# Patient Record
Sex: MALE | Race: WHITE | NOT HISPANIC OR LATINO | URBAN - METROPOLITAN AREA
[De-identification: names, ages, dates, MRNs, and addresses within clinical notes are randomized per-mention and may not be internally consistent; named-entity substitution may affect disease eponyms.]

---

## 2023-05-12 ENCOUNTER — OFFICE VISIT (OUTPATIENT)
Age: 63
End: 2023-05-12

## 2023-05-12 VITALS
BODY MASS INDEX: 18.04 KG/M2 | DIASTOLIC BLOOD PRESSURE: 88 MMHG | WEIGHT: 126 LBS | HEIGHT: 70 IN | SYSTOLIC BLOOD PRESSURE: 132 MMHG

## 2023-05-12 DIAGNOSIS — Z90.49 HISTORY OF COLECTOMY: ICD-10-CM

## 2023-05-12 DIAGNOSIS — Z86.010 PERSONAL HISTORY OF COLONIC POLYPS: ICD-10-CM

## 2023-05-12 DIAGNOSIS — K59.00 CONSTIPATION, UNSPECIFIED CONSTIPATION TYPE: Primary | ICD-10-CM

## 2023-05-12 DIAGNOSIS — Z80.0 FAMILY HX OF COLON CANCER: ICD-10-CM

## 2023-05-12 DIAGNOSIS — Z12.11 COLON CANCER SCREENING: ICD-10-CM

## 2023-05-12 RX ORDER — ATORVASTATIN CALCIUM 40 MG/1
40 TABLET, FILM COATED ORAL DAILY
COMMUNITY

## 2023-05-12 RX ORDER — ASPIRIN 81 MG/1
81 TABLET ORAL DAILY
COMMUNITY

## 2023-05-12 RX ORDER — LINACLOTIDE 72 UG/1
72 CAPSULE, GELATIN COATED ORAL DAILY
Qty: 30 CAPSULE | Refills: 3 | Status: SHIPPED | OUTPATIENT
Start: 2023-05-12 | End: 2023-06-11

## 2023-05-12 NOTE — PROGRESS NOTES
[id number] - Outpatient Consultation  Wendi Mata 70 y o  male MRN: [id number]  Encounter: [id number]    ASSESSMENT AND PLAN:      1  Constipation, unspecified constipation type  --Patient with a change in bowel habit with irregularity of movements since August 2022  Had a hernia repair around that time and this did not improve the symptoms  Minimal straining and no blood per rectum  Has responded to stool softeners and MiraLAX as needed  Minor lower abdominal discomfort prior to bowel movements  Had recent colonoscopy and polyps were removed  -Recommend continue stool softeners  -Increase liquid intake patient should drink about a liter and a half of water or fluid per day  -Patient has not done well with fiber so we will try Linzess or MiraLAX    - linaCLOtide (Linzess) 72 MCG CAPS; Take 72 mcg by mouth in the morning  Dispense: 30 capsule; Refill: 3   -MiraLAX 17 g daily as needed  If this causes problems with diarrhea then I would have him take 8 5 g   He could choose between the [address] and MiraLAX  If [address] is causing diarrhea and cramping then he would need to discontinue  This is the lowest dose available and is covered by his insurance    2  Personal history of colonic polyps  -Patient with recent colonoscopy done at [facility] [address] need to check report  Evidently patient had polyps to the degree that he needs a 1 year recall    3  Family hx of colon cancer  --There with a history of colon cancer in her 62s  Siblings have a history of colon polyps    4  History of colectomy  --Required surgery for a perforated colon as a complication of colonoscopy about 20 years ago  Also resection of the sigmoid colon for diverticulitis 4 years ago  2 colon surgeries      5  Colon cancer screening-  -up-to-date with colon cancer screening    7  Tobacco dependence-smokes a pack and half cigarettes per day and has done for many years    Does get yearly CT scans of the chest for lung cancer screening per his PCP Dr Chasity Carlson      Followup Appointment:  6 mo   ______________________________________________________________________    Chief Complaint   Patient presents with   • Constipation since the summer       HPI:   Susie Shepard is a 70y o  year old male who presents for evaluation of constipation  Patient does have a complicated gastrointestinal history  Specifically, patient had an iatrogenic colonic perforation with course of a colonoscopy about 20 years ago  He had a partial colectomy at that time and recovered  Needless to say he was somewhat reluctant to undergo colonoscopy again  Approximately 4 years ago patient underwent attempted colonoscopy and the examination could not be completed secondary to diverticular disease  Attempt at virtual colonoscopy was likewise incomplete secondary to his anatomy  Patient subsequently underwent repeat resection [facility] the colon at [facility]   He underwent colonoscopy about 4 months ago at the [address] and that examination was completed  He had multiple polyps at that time and was advised that he needs a follow-up colonoscopy within 1 year  Patient also underwent inguinal hernia surgery last August   He states that since that time he has had some problems with constipation  He has to take stool softeners and Dulcolax in order to have an effective bowel movement  He does have nearly daily bowel movements but they are a little bit on the looser watery side  He does have some occasional straining  There is no blood per rectum  He denies abdominal pain  When he takes MiraLAX he does reasonably well  Water intake he reports is about average  Timing of the constipation seems to coincide when he had his last surgery but he is not sure if this is just a coincidence  It should be noted the patient has a very strong family history of colon neoplasm in his family    Specifically, patient's mother had colon "cancer in her 62s   2 siblings have had colorectal polyps  Historical Information   Past Medical History:   Diagnosis Date   • Colon polyp    • Hyperlipidemia    • Skin cancer      Past Surgical History:   Procedure Laterality Date   • COLECTOMY     • COLONOSCOPY     • HERNIA REPAIR       Social History     Substance and Sexual Activity   Alcohol Use Not Currently     Social History     Substance and Sexual Activity   Drug Use Not Currently     Social History     Tobacco Use   Smoking Status Every Day   • Packs/day: 1 50   • Types: Cigarettes   Smokeless Tobacco Never     Family History   Problem Relation Age of Onset   • Cancer Mother         Bladder CA   • Breast cancer Mother    • Colon cancer Mother    • Colon polyps Sister    • Colon polyps Brother        Meds/Allergies     Current Outpatient Medications:   •  aspirin (ECOTRIN LOW STRENGTH) 81 mg EC tablet  •  atorvastatin (LIPITOR) 40 mg tablet  •  linaCLOtide (Linzess) 72 MCG CAPS    No Known Allergies    PHYSICAL EXAM:    Blood pressure 132/88, height 5' 9 5\" (1 765 m), weight 57 2 kg (126 lb)  Body mass index is 18 34 kg/m²  General Appearance: NAD, cooperative, alert  Eyes: Anicteric, conjunctiva pink  ENT:  Normocephalic, atraumatic, normal mucosa  Neck:  Supple, symmetrical, trachea midline,   Resp:  Clear to auscultation bilaterally; no rales, rhonchi or wheezing; respirations unlabored   CV:  S1 S2, Regular rate and rhythm; no murmur, rub, or gallop  GI:  Soft, non-tender, non-distended; normal bowel sounds; no masses, no organomegaly--midline lower abdominal scar from the umbilicus to the symphysis  Left inguinal hernia scar and fullness from previous surgery no hernia  Rectal: Deferred  Musculoskeletal: No cyanosis, clubbing or edema  Normal ROM    Skin:  No jaundice, rashes, or lesions   Heme/Lymph: No palpable cervical lymphadenopathy  Psych: Normal affect, good eye contact  Neuro: No gross deficits, AAOx3          REVIEW OF " SYSTEMS:    CONSTITUTIONAL: Denies any fever, chills, rigors, and weight loss  HEENT: No earache or tinnitus  Denies hearing loss or visual disturbances  CARDIOVASCULAR: No chest pain or palpitations  RESPIRATORY: Denies any cough, hemoptysis, shortness of breath or dyspnea on exertion  GASTROINTESTINAL: As noted in the History of Present Illness  GENITOURINARY: No problems with urination  Denies any hematuria or dysuria  NEUROLOGIC: No dizziness or vertigo, denies headaches  MUSCULOSKELETAL: Denies any muscle or joint pain  SKIN: Denies skin rashes or itching  ENDOCRINE: Denies excessive thirst  Denies intolerance to heat or cold  PSYCHOSOCIAL: Denies depression or anxiety  Denies any recent memory loss

## 2023-05-12 NOTE — PATIENT INSTRUCTIONS
[id number] - Outpatient Consultation  Horacio Khan 70 y o  male MRN: [id number]  Encounter: [id number]    ASSESSMENT AND PLAN:      1  Constipation, unspecified constipation type  --Patient with a change in bowel habit with irregularity of movements since August 2022  Had a hernia repair around that time and this did not improve the symptoms  Minimal straining and no blood per rectum  Has responded to stool softeners and MiraLAX as needed  Minor lower abdominal discomfort prior to bowel movements  Had recent colonoscopy and polyps were removed  -Recommend continue stool softeners  -Increase liquid intake patient should drink about a liter and a half of water or fluid per day  -Patient has not done well with fiber so we will try Linzess or MiraLAX    - linaCLOtide (Linzess) 72 MCG CAPS; Take 72 mcg by mouth in the morning  Dispense: 30 capsule; Refill: 3   -MiraLAX 17 g daily as needed  If this causes problems with diarrhea then I would have him take 8 5 g   He could choose between the [address] and MiraLAX  If [address] is causing diarrhea and cramping then he would need to discontinue  This is the lowest dose available and is covered by his insurance    2  Personal history of colonic polyps  -Patient with recent colonoscopy done at [facility] [address] need to check report  Evidently patient had polyps to the degree that he needs a 1 year recall    3  Family hx of colon cancer  --There with a history of colon cancer in her 62s  Siblings have a history of colon polyps    4  History of colectomy  --Required surgery for a perforated colon as a complication of colonoscopy about 20 years ago  Also resection of the sigmoid colon for diverticulitis 4 years ago  2 colon surgeries      5  Colon cancer screening-  -up-to-date with colon cancer screening    7  Tobacco dependence-smokes a pack and half cigarettes per day and has done for many years    Does get yearly CT scans of the chest for lung cancer screening per his PCP Dr Michael Field      Followup Appointment:  6 mo

## 2023-05-17 ENCOUNTER — TELEPHONE (OUTPATIENT)
Dept: GASTROENTEROLOGY | Facility: CLINIC | Age: 63
End: 2023-05-17

## 2023-05-17 DIAGNOSIS — K59.00 CONSTIPATION, UNSPECIFIED CONSTIPATION TYPE: ICD-10-CM

## 2023-05-17 DIAGNOSIS — R10.32 LEFT LOWER QUADRANT ABDOMINAL PAIN: ICD-10-CM

## 2023-05-17 DIAGNOSIS — Z01.812 BLOOD TESTS PRIOR TO TREATMENT OR PROCEDURE: Primary | ICD-10-CM

## 2023-05-17 NOTE — TELEPHONE ENCOUNTER
Patients GI provider:  Dr Mckeon Grate    Number to return call: [phone]    Reason for call: Pt calling stating he went to pharmacy to  Linzess and the cost was over $500 00  He can not afford     Please contact patient to assist     Scheduled procedure/appointment date if applicable:

## 2023-05-17 NOTE — TELEPHONE ENCOUNTER
I spoke with patient  I advised he increase his MiraLax to BID dosing at this time (and keep hydrated) and continue his Colace stool softener but take one BID vs taking 300 mg daily in the morning  I instructed him to call back if bloating/cramping  Amitiza may be an option since it is available in generic form but I did tell him pharmacy would need to process  He is aware you are out of office until 5/24/23 but I did inform him you do check your messages  Please advise

## 2023-05-19 RX ORDER — PLECANATIDE 3 MG/1
3 TABLET ORAL DAILY
Qty: 30 TABLET | Refills: 2 | Status: SHIPPED | OUTPATIENT
Start: 2023-05-19 | End: 2023-06-18

## 2023-05-19 NOTE — TELEPHONE ENCOUNTER
I called patient, no answer, left message that CT order faxed patient registration along with recent labs  I left phone number to call to schedule

## 2023-05-19 NOTE — TELEPHONE ENCOUNTER
Patient called and scheduled CT, scheduling told him he needs new labs prior to  Please call patient to advise when orders are placed      [phone]

## 2023-05-19 NOTE — ADDENDUM NOTE
Addended by: Yaima [facility] on: 5/19/2023 10:54 AM     Modules accepted: Orders

## 2023-05-19 NOTE — TELEPHONE ENCOUNTER
"I called the patient and discussed the situation  He almost just feels as though there is \"a blockage of some type\" feels some pressure buildup prior to a bowel movement  Unable to get Linzess  We will prescribe Amitiza 24 mcg twice a day  We order generic version  Also just to evaluate the situation we will do a CT scan abdomen and pelvis oral and IV contrast   Plan to rule out any type of colonic stricture  I think he like to get his studies at [facility]  Please also try to obtain a copy of the patient's recent colonoscopy from [facility]-this was done earlier this year--looks like [employer] is on the formulary and may be better coverage than Amitiza  We will prescribe this 1    Please inform patient- if too much - check on how much lubiprostol would be - could consider [address]   "